# Patient Record
Sex: MALE | Race: WHITE | NOT HISPANIC OR LATINO | Employment: FULL TIME | URBAN - METROPOLITAN AREA
[De-identification: names, ages, dates, MRNs, and addresses within clinical notes are randomized per-mention and may not be internally consistent; named-entity substitution may affect disease eponyms.]

---

## 2018-06-06 ENCOUNTER — APPOINTMENT (EMERGENCY)
Dept: CT IMAGING | Facility: HOSPITAL | Age: 53
DRG: 304 | End: 2018-06-06

## 2018-06-06 ENCOUNTER — HOSPITAL ENCOUNTER (INPATIENT)
Facility: HOSPITAL | Age: 53
LOS: 1 days | Discharge: HOME | DRG: 304 | End: 2018-06-07
Attending: EMERGENCY MEDICINE | Admitting: INTERNAL MEDICINE

## 2018-06-06 DIAGNOSIS — Z72.0 TOBACCO ABUSE: ICD-10-CM

## 2018-06-06 DIAGNOSIS — I16.0 HYPERTENSIVE URGENCY: Primary | ICD-10-CM

## 2018-06-06 DIAGNOSIS — E11.9 TYPE 2 DIABETES MELLITUS WITHOUT COMPLICATION, WITHOUT LONG-TERM CURRENT USE OF INSULIN (HCC): ICD-10-CM

## 2018-06-06 DIAGNOSIS — Z86.73 HISTORY OF CVA (CEREBROVASCULAR ACCIDENT): ICD-10-CM

## 2018-06-06 LAB
ANION GAP SERPL CALCULATED.3IONS-SCNC: 9 MMOL/L (ref 4–13)
ARTERIAL PATENCY WRIST A: ABNORMAL
BASE EXCESS BLDA CALC-SCNC: 0 MMOL/L (ref -2–3)
BASOPHILS # BLD AUTO: 0.11 THOUSANDS/ΜL (ref 0–0.1)
BASOPHILS NFR BLD AUTO: 1 % (ref 0–1)
BILIRUB UR QL STRIP: NEGATIVE
BUN SERPL-MCNC: 16 MG/DL (ref 5–25)
CALCIUM SERPL-MCNC: 9.5 MG/DL (ref 8.3–10.1)
CHLORIDE SERPL-SCNC: 100 MMOL/L (ref 100–108)
CLARITY UR: CLEAR
CO2 SERPL-SCNC: 27 MMOL/L (ref 21–32)
COLOR UR: YELLOW
CREAT SERPL-MCNC: 1.36 MG/DL (ref 0.6–1.3)
EOSINOPHIL # BLD AUTO: 0.2 THOUSAND/ΜL (ref 0–0.61)
EOSINOPHIL NFR BLD AUTO: 1 % (ref 0–6)
ERYTHROCYTE [DISTWIDTH] IN BLOOD BY AUTOMATED COUNT: 12.4 % (ref 11.6–15.1)
FIO2 GAS DIL.REBREATH: 21 L
GFR SERPL CREATININE-BSD FRML MDRD: 60 ML/MIN/1.73SQ M
GLUCOSE SERPL-MCNC: 192 MG/DL (ref 65–140)
GLUCOSE UR STRIP-MCNC: NEGATIVE MG/DL
HCO3 BLDA-SCNC: 24 MMOL/L (ref 22–28)
HCT VFR BLD AUTO: 47.3 % (ref 36.5–49.3)
HGB BLD-MCNC: 16.2 G/DL (ref 12–17)
HGB UR QL STRIP.AUTO: ABNORMAL
IMM GRANULOCYTES # BLD AUTO: 0.12 THOUSAND/UL (ref 0–0.2)
IMM GRANULOCYTES NFR BLD AUTO: 1 % (ref 0–2)
KETONES UR STRIP-MCNC: NEGATIVE MG/DL
LEUKOCYTE ESTERASE UR QL STRIP: NEGATIVE
LYMPHOCYTES # BLD AUTO: 2.34 THOUSANDS/ΜL (ref 0.6–4.47)
LYMPHOCYTES NFR BLD AUTO: 15 % (ref 14–44)
MCH RBC QN AUTO: 30 PG (ref 26.8–34.3)
MCHC RBC AUTO-ENTMCNC: 34.2 G/DL (ref 31.4–37.4)
MCV RBC AUTO: 88 FL (ref 82–98)
MONOCYTES # BLD AUTO: 0.9 THOUSAND/ΜL (ref 0.17–1.22)
MONOCYTES NFR BLD AUTO: 6 % (ref 4–12)
NEUTROPHILS # BLD AUTO: 11.77 THOUSANDS/ΜL (ref 1.85–7.62)
NEUTS SEG NFR BLD AUTO: 76 % (ref 43–75)
NITRITE UR QL STRIP: NEGATIVE
NRBC BLD AUTO-RTO: 0 /100 WBCS
PCO2 BLD: 25 MMOL/L (ref 21–32)
PCO2 BLD: 37.2 MM HG (ref 36–44)
PH BLD: 7.42 [PH] (ref 7.35–7.45)
PH UR STRIP.AUTO: 6 [PH] (ref 4.5–8)
PLATELET # BLD AUTO: 236 THOUSANDS/UL (ref 149–390)
PMV BLD AUTO: 10.7 FL (ref 8.9–12.7)
PO2 BLD: 66 MM HG (ref 75–129)
POTASSIUM SERPL-SCNC: 4.2 MMOL/L (ref 3.5–5.3)
PROT UR STRIP-MCNC: ABNORMAL MG/DL
RBC # BLD AUTO: 5.4 MILLION/UL (ref 3.88–5.62)
SAMPLE SITE: ABNORMAL
SAO2 % BLD FROM PO2: 93 % (ref 95–98)
SODIUM SERPL-SCNC: 136 MMOL/L (ref 136–145)
SP GR UR STRIP.AUTO: 1.02 (ref 1–1.03)
SPECIMEN SOURCE: ABNORMAL
TROPONIN I SERPL-MCNC: <0.02 NG/ML
UROBILINOGEN UR QL STRIP.AUTO: 0.2 E.U./DL
WBC # BLD AUTO: 15.44 THOUSAND/UL (ref 4.31–10.16)

## 2018-06-06 PROCEDURE — 85025 COMPLETE CBC W/AUTO DIFF WBC: CPT | Performed by: EMERGENCY MEDICINE

## 2018-06-06 PROCEDURE — 96375 TX/PRO/DX INJ NEW DRUG ADDON: CPT

## 2018-06-06 PROCEDURE — 36600 WITHDRAWAL OF ARTERIAL BLOOD: CPT

## 2018-06-06 PROCEDURE — 96374 THER/PROPH/DIAG INJ IV PUSH: CPT

## 2018-06-06 PROCEDURE — 80048 BASIC METABOLIC PNL TOTAL CA: CPT | Performed by: EMERGENCY MEDICINE

## 2018-06-06 PROCEDURE — 96361 HYDRATE IV INFUSION ADD-ON: CPT

## 2018-06-06 PROCEDURE — 81001 URINALYSIS AUTO W/SCOPE: CPT | Performed by: EMERGENCY MEDICINE

## 2018-06-06 PROCEDURE — 82803 BLOOD GASES ANY COMBINATION: CPT

## 2018-06-06 PROCEDURE — 70450 CT HEAD/BRAIN W/O DYE: CPT

## 2018-06-06 PROCEDURE — 36415 COLL VENOUS BLD VENIPUNCTURE: CPT | Performed by: EMERGENCY MEDICINE

## 2018-06-06 PROCEDURE — 84484 ASSAY OF TROPONIN QUANT: CPT | Performed by: EMERGENCY MEDICINE

## 2018-06-06 PROCEDURE — 93005 ELECTROCARDIOGRAM TRACING: CPT

## 2018-06-06 RX ORDER — DEXAMETHASONE SODIUM PHOSPHATE 10 MG/ML
8 INJECTION, SOLUTION INTRAMUSCULAR; INTRAVENOUS ONCE
Status: COMPLETED | OUTPATIENT
Start: 2018-06-06 | End: 2018-06-06

## 2018-06-06 RX ORDER — LISINOPRIL 10 MG/1
10 TABLET ORAL DAILY
Status: ON HOLD | COMMUNITY
End: 2018-06-07

## 2018-06-06 RX ORDER — DIPHENHYDRAMINE HYDROCHLORIDE 50 MG/ML
25 INJECTION INTRAMUSCULAR; INTRAVENOUS ONCE
Status: COMPLETED | OUTPATIENT
Start: 2018-06-06 | End: 2018-06-06

## 2018-06-06 RX ORDER — METOCLOPRAMIDE HYDROCHLORIDE 5 MG/ML
5 INJECTION INTRAMUSCULAR; INTRAVENOUS ONCE
Status: COMPLETED | OUTPATIENT
Start: 2018-06-06 | End: 2018-06-06

## 2018-06-06 RX ORDER — LABETALOL HYDROCHLORIDE 5 MG/ML
10 INJECTION, SOLUTION INTRAVENOUS ONCE
Status: COMPLETED | OUTPATIENT
Start: 2018-06-06 | End: 2018-06-06

## 2018-06-06 RX ADMIN — LABETALOL 20 MG/4 ML (5 MG/ML) INTRAVENOUS SYRINGE 10 MG: at 23:10

## 2018-06-06 RX ADMIN — SODIUM CHLORIDE 1000 ML: 0.9 INJECTION, SOLUTION INTRAVENOUS at 22:16

## 2018-06-06 RX ADMIN — DIPHENHYDRAMINE HYDROCHLORIDE 25 MG: 50 INJECTION, SOLUTION INTRAMUSCULAR; INTRAVENOUS at 22:24

## 2018-06-06 RX ADMIN — DEXAMETHASONE SODIUM PHOSPHATE 8 MG: 10 INJECTION, SOLUTION INTRAMUSCULAR; INTRAVENOUS at 22:23

## 2018-06-06 RX ADMIN — METOCLOPRAMIDE 5 MG: 5 INJECTION, SOLUTION INTRAMUSCULAR; INTRAVENOUS at 22:22

## 2018-06-06 RX ADMIN — SODIUM CHLORIDE 1000 ML: 0.9 INJECTION, SOLUTION INTRAVENOUS at 23:34

## 2018-06-07 ENCOUNTER — APPOINTMENT (INPATIENT)
Dept: MRI IMAGING | Facility: HOSPITAL | Age: 53
DRG: 304 | End: 2018-06-07

## 2018-06-07 ENCOUNTER — APPOINTMENT (INPATIENT)
Dept: NON INVASIVE DIAGNOSTICS | Facility: HOSPITAL | Age: 53
DRG: 304 | End: 2018-06-07

## 2018-06-07 ENCOUNTER — APPOINTMENT (INPATIENT)
Dept: ULTRASOUND IMAGING | Facility: HOSPITAL | Age: 53
DRG: 304 | End: 2018-06-07

## 2018-06-07 VITALS
HEART RATE: 100 BPM | DIASTOLIC BLOOD PRESSURE: 83 MMHG | OXYGEN SATURATION: 96 % | TEMPERATURE: 97.7 F | BODY MASS INDEX: 28.97 KG/M2 | SYSTOLIC BLOOD PRESSURE: 140 MMHG | HEIGHT: 74 IN | RESPIRATION RATE: 20 BRPM | WEIGHT: 225.75 LBS

## 2018-06-07 PROBLEM — D72.829 LEUKOCYTOSIS: Status: ACTIVE | Noted: 2018-06-07

## 2018-06-07 PROBLEM — R51.9 HEADACHE: Status: ACTIVE | Noted: 2018-06-07

## 2018-06-07 PROBLEM — Z72.0 TOBACCO ABUSE: Status: ACTIVE | Noted: 2018-06-07

## 2018-06-07 PROBLEM — N28.9 ACUTE KIDNEY INSUFFICIENCY: Status: ACTIVE | Noted: 2018-06-07

## 2018-06-07 PROBLEM — I16.0 HYPERTENSIVE URGENCY: Status: ACTIVE | Noted: 2018-06-07

## 2018-06-07 PROBLEM — Z86.73 HISTORY OF CVA (CEREBROVASCULAR ACCIDENT): Status: ACTIVE | Noted: 2018-06-07

## 2018-06-07 LAB
ANION GAP SERPL CALCULATED.3IONS-SCNC: 13 MMOL/L (ref 4–13)
ATRIAL RATE: 96 BPM
BACTERIA UR QL AUTO: ABNORMAL /HPF
BASOPHILS # BLD AUTO: 0.04 THOUSANDS/ΜL (ref 0–0.1)
BASOPHILS NFR BLD AUTO: 0 % (ref 0–1)
BUN SERPL-MCNC: 14 MG/DL (ref 5–25)
CALCIUM SERPL-MCNC: 9.2 MG/DL (ref 8.3–10.1)
CHLORIDE SERPL-SCNC: 102 MMOL/L (ref 100–108)
CHOLEST SERPL-MCNC: 245 MG/DL (ref 50–200)
CO2 SERPL-SCNC: 22 MMOL/L (ref 21–32)
CREAT SERPL-MCNC: 1.18 MG/DL (ref 0.6–1.3)
EOSINOPHIL # BLD AUTO: 0.01 THOUSAND/ΜL (ref 0–0.61)
EOSINOPHIL NFR BLD AUTO: 0 % (ref 0–6)
ERYTHROCYTE [DISTWIDTH] IN BLOOD BY AUTOMATED COUNT: 12.5 % (ref 11.6–15.1)
EST. AVERAGE GLUCOSE BLD GHB EST-MCNC: 209 MG/DL
GFR SERPL CREATININE-BSD FRML MDRD: 71 ML/MIN/1.73SQ M
GLUCOSE SERPL-MCNC: 233 MG/DL (ref 65–140)
GLUCOSE SERPL-MCNC: 234 MG/DL (ref 65–140)
GLUCOSE SERPL-MCNC: 246 MG/DL (ref 65–140)
HBA1C MFR BLD: 8.9 % (ref 4.2–6.3)
HCT VFR BLD AUTO: 45.9 % (ref 36.5–49.3)
HDLC SERPL-MCNC: 37 MG/DL (ref 40–60)
HGB BLD-MCNC: 15.7 G/DL (ref 12–17)
IMM GRANULOCYTES # BLD AUTO: 0.08 THOUSAND/UL (ref 0–0.2)
IMM GRANULOCYTES NFR BLD AUTO: 1 % (ref 0–2)
LDLC SERPL CALC-MCNC: 153 MG/DL (ref 0–100)
LYMPHOCYTES # BLD AUTO: 1.09 THOUSANDS/ΜL (ref 0.6–4.47)
LYMPHOCYTES NFR BLD AUTO: 10 % (ref 14–44)
MCH RBC QN AUTO: 29.8 PG (ref 26.8–34.3)
MCHC RBC AUTO-ENTMCNC: 34.2 G/DL (ref 31.4–37.4)
MCV RBC AUTO: 87 FL (ref 82–98)
MONOCYTES # BLD AUTO: 0.18 THOUSAND/ΜL (ref 0.17–1.22)
MONOCYTES NFR BLD AUTO: 2 % (ref 4–12)
NEUTROPHILS # BLD AUTO: 9.54 THOUSANDS/ΜL (ref 1.85–7.62)
NEUTS SEG NFR BLD AUTO: 87 % (ref 43–75)
NON-SQ EPI CELLS URNS QL MICRO: ABNORMAL /HPF
NRBC BLD AUTO-RTO: 0 /100 WBCS
P AXIS: 52 DEGREES
PLATELET # BLD AUTO: 218 THOUSANDS/UL (ref 149–390)
PMV BLD AUTO: 11.3 FL (ref 8.9–12.7)
POTASSIUM SERPL-SCNC: 4.2 MMOL/L (ref 3.5–5.3)
PR INTERVAL: 170 MS
QRS AXIS: 0 DEGREES
QRSD INTERVAL: 84 MS
QT INTERVAL: 348 MS
QTC INTERVAL: 439 MS
RBC # BLD AUTO: 5.27 MILLION/UL (ref 3.88–5.62)
RBC #/AREA URNS AUTO: ABNORMAL /HPF
SODIUM SERPL-SCNC: 137 MMOL/L (ref 136–145)
T WAVE AXIS: 53 DEGREES
TRIGL SERPL-MCNC: 275 MG/DL
VENTRICULAR RATE: 96 BPM
WBC # BLD AUTO: 10.94 THOUSAND/UL (ref 4.31–10.16)
WBC #/AREA URNS AUTO: ABNORMAL /HPF

## 2018-06-07 PROCEDURE — 85025 COMPLETE CBC W/AUTO DIFF WBC: CPT | Performed by: NURSE PRACTITIONER

## 2018-06-07 PROCEDURE — 99223 1ST HOSP IP/OBS HIGH 75: CPT | Performed by: NURSE PRACTITIONER

## 2018-06-07 PROCEDURE — 99285 EMERGENCY DEPT VISIT HI MDM: CPT

## 2018-06-07 PROCEDURE — 96361 HYDRATE IV INFUSION ADD-ON: CPT

## 2018-06-07 PROCEDURE — 83036 HEMOGLOBIN GLYCOSYLATED A1C: CPT | Performed by: NURSE PRACTITIONER

## 2018-06-07 PROCEDURE — 70551 MRI BRAIN STEM W/O DYE: CPT

## 2018-06-07 PROCEDURE — 80048 BASIC METABOLIC PNL TOTAL CA: CPT | Performed by: NURSE PRACTITIONER

## 2018-06-07 PROCEDURE — 93010 ELECTROCARDIOGRAM REPORT: CPT | Performed by: INTERNAL MEDICINE

## 2018-06-07 PROCEDURE — 82948 REAGENT STRIP/BLOOD GLUCOSE: CPT

## 2018-06-07 PROCEDURE — 93306 TTE W/DOPPLER COMPLETE: CPT

## 2018-06-07 PROCEDURE — 99255 IP/OBS CONSLTJ NEW/EST HI 80: CPT | Performed by: PSYCHIATRY & NEUROLOGY

## 2018-06-07 PROCEDURE — 80061 LIPID PANEL: CPT | Performed by: NURSE PRACTITIONER

## 2018-06-07 PROCEDURE — 76770 US EXAM ABDO BACK WALL COMP: CPT

## 2018-06-07 PROCEDURE — 99238 HOSP IP/OBS DSCHRG MGMT 30/<: CPT | Performed by: INTERNAL MEDICINE

## 2018-06-07 RX ORDER — NICOTINE 21 MG/24HR
1 PATCH, TRANSDERMAL 24 HOURS TRANSDERMAL ONCE
Qty: 28 PATCH | Refills: 0 | Status: SHIPPED | OUTPATIENT
Start: 2018-06-07 | End: 2018-06-07

## 2018-06-07 RX ORDER — ATORVASTATIN CALCIUM 40 MG/1
40 TABLET, FILM COATED ORAL EVERY EVENING
Qty: 30 TABLET | Refills: 5 | Status: SHIPPED | OUTPATIENT
Start: 2018-06-07

## 2018-06-07 RX ORDER — NICOTINE 21 MG/24HR
14 PATCH, TRANSDERMAL 24 HOURS TRANSDERMAL ONCE
Status: DISCONTINUED | OUTPATIENT
Start: 2018-06-07 | End: 2018-06-07 | Stop reason: HOSPADM

## 2018-06-07 RX ORDER — HYDRALAZINE HYDROCHLORIDE 20 MG/ML
5 INJECTION INTRAMUSCULAR; INTRAVENOUS EVERY 6 HOURS PRN
Status: DISCONTINUED | OUTPATIENT
Start: 2018-06-07 | End: 2018-06-07 | Stop reason: HOSPADM

## 2018-06-07 RX ORDER — ASPIRIN 81 MG/1
81 TABLET, CHEWABLE ORAL DAILY
Refills: 0
Start: 2018-06-08

## 2018-06-07 RX ORDER — HYDRALAZINE HYDROCHLORIDE 20 MG/ML
5 INJECTION INTRAMUSCULAR; INTRAVENOUS ONCE
Status: COMPLETED | OUTPATIENT
Start: 2018-06-07 | End: 2018-06-07

## 2018-06-07 RX ORDER — ASPIRIN 81 MG/1
81 TABLET, CHEWABLE ORAL DAILY
Status: DISCONTINUED | OUTPATIENT
Start: 2018-06-07 | End: 2018-06-07 | Stop reason: HOSPADM

## 2018-06-07 RX ORDER — ACETAMINOPHEN 325 MG/1
650 TABLET ORAL EVERY 6 HOURS PRN
Status: DISCONTINUED | OUTPATIENT
Start: 2018-06-07 | End: 2018-06-07 | Stop reason: HOSPADM

## 2018-06-07 RX ORDER — ATORVASTATIN CALCIUM 40 MG/1
40 TABLET, FILM COATED ORAL EVERY EVENING
Status: DISCONTINUED | OUTPATIENT
Start: 2018-06-07 | End: 2018-06-07 | Stop reason: HOSPADM

## 2018-06-07 RX ORDER — SODIUM CHLORIDE 9 MG/ML
100 INJECTION, SOLUTION INTRAVENOUS CONTINUOUS
Status: DISCONTINUED | OUTPATIENT
Start: 2018-06-07 | End: 2018-06-07 | Stop reason: HOSPADM

## 2018-06-07 RX ORDER — LABETALOL HYDROCHLORIDE 5 MG/ML
20 INJECTION, SOLUTION INTRAVENOUS EVERY 4 HOURS PRN
Status: DISCONTINUED | OUTPATIENT
Start: 2018-06-07 | End: 2018-06-07 | Stop reason: HOSPADM

## 2018-06-07 RX ORDER — LISINOPRIL 10 MG/1
10 TABLET ORAL DAILY
Qty: 30 TABLET | Refills: 5 | Status: SHIPPED | OUTPATIENT
Start: 2018-06-07

## 2018-06-07 RX ADMIN — SODIUM CHLORIDE 100 ML/HR: 0.9 INJECTION, SOLUTION INTRAVENOUS at 06:09

## 2018-06-07 RX ADMIN — ASPIRIN 81 MG 81 MG: 81 TABLET ORAL at 12:42

## 2018-06-07 RX ADMIN — SITAGLIPTIN 100 MG: 50 TABLET, FILM COATED ORAL at 12:42

## 2018-06-07 RX ADMIN — LABETALOL 20 MG/4 ML (5 MG/ML) INTRAVENOUS SYRINGE 20 MG: at 06:35

## 2018-06-07 RX ADMIN — NICOTINE 14 MG: 14 PATCH, EXTENDED RELEASE TRANSDERMAL at 12:43

## 2018-06-07 RX ADMIN — HYDRALAZINE HYDROCHLORIDE 5 MG: 20 INJECTION INTRAMUSCULAR; INTRAVENOUS at 01:54

## 2018-06-07 RX ADMIN — INSULIN LISPRO 2 UNITS: 100 INJECTION, SOLUTION INTRAVENOUS; SUBCUTANEOUS at 12:43

## 2018-06-07 NOTE — H&P
H&P- Evertt Keto 1965, 47 y o  male MRN: [id number]    Unit/Bed#: [address] [place] [address] Encounter: [id number]    Primary Care Provider: Shashi To DO   Date and time admitted to hospital: 6/6/2018  9:57 PM        * Hypertensive urgency   Assessment & Plan    SBP ranging from 170s to 200s  Patient restart lisinopril 2 days ago after stopping it on his own 2 years ago  Will hold lisinopril due to elevated creatinine  Will give hydralazine and labetalol p r n  SBP greater than 180  Restart lisinopril when creatinine improves  Headache   Assessment & Plan    Headache has resolved  Patient was given Decadron, Benadryl, and Reglan in ED  Headache most likely related to elevated BP  Tylenol p r n  headache  Leukocytosis   Assessment & Plan    No obvious signs of infection  Patient was given 1 dose of IV Decadron in the ED  Will administer IV fluids and recheck CBC in a m           Acute kidney insufficiency   Assessment & Plan    Creatinine 1 36 in the ED  No prior history of kidney disease  Will hold lisinopril and metformin while creatinine is elevated  Administer IV fluids for hydration  Recheck BMP in a m     Will obtain renal ultrasound in a m  to look for hydronephrosis  Hypertension   Assessment & Plan    Patient stopped taking lisinopril 2 years ago  Restarted medication 2 days ago  Hold lisinopril for now  Administer hydralazine and labetalol p r n     Continue to monitor BP per nursing unit  Diabetes mellitus [facility])   Assessment & Plan    Patient takes metformin and Januvia at home for his diabetes  Will hold metformin for now due to elevated creatinine  Continue Januvia  Obtain hemoglobin A1c  Monitor blood glucose q a c  and HS and initiate SSI  History of CVA (cerebrovascular accident)   Assessment & Plan    CT scan of head shows multiple chronic lacunar infarcts in the periventricular white matter and right basal ganglia    Patient was unaware of previous CVA  Place patient on Lipitor and aspirin  Obtain MRI and echocardiogram   Inpatient consult to Neurology  VTE Prophylaxis: Pt low risk for VTE  / sequential compression device   Code Status: Full code  POLST: There is no POLST form on file for this patient (pre-hospital)  Discussion with family: Deon present during admission    Anticipated Length of Stay:  Patient will be admitted on an Inpatient basis with an anticipated length of stay of  > 2 midnights  Justification for Hospital Stay: Close neurologic monitoring    Total Time for Visit, including Counseling / Coordination of Care: 30 minutes  Greater than 50% of this total time spent on direct patient counseling and coordination of care  Chief Complaint:   Headache    History of Present Illness:    Artie Quiroz is a 47 y o  male history of diabetes mellitus type 2, hypertension, and current every day smoker who presents with complaints of severe headache  Describes headache as being 12/10 and located frontal area  Patient thought he was having a stroke because he developed a sudden severe headache and had disorientation and slurring of words  In the ED patient has WBC count of 15 44, creatinine 1 36, glucose 192  SBP ranging from 873-794 with diastolic BP in the 365G  CT of head showed multiple chronic lacunar infarcts in the periventricular white matter and right basal ganglia  Patient states that he stopped taking his blood pressure medication 2 years ago because he lost insurance  He was restarted on lisinopril 2 days ago  Patient was unaware of previous strokes  Patient denies fevers or chills  No nausea, vomiting, or diarrhea  Denies chest pain or palpitations  No change in vision  Review of Systems:    Review of Systems   Constitutional: Positive for activity change  Negative for appetite change, chills and fever  Eyes: Negative for visual disturbance     Respiratory: Negative for apnea, cough and shortness of breath  Cardiovascular: Negative for chest pain, palpitations and leg swelling  Gastrointestinal: Negative for abdominal distention, abdominal pain, constipation, diarrhea, nausea and vomiting  Genitourinary: Negative for dysuria  Neurological: Positive for speech difficulty and headaches  Negative for dizziness, seizures, facial asymmetry, weakness, light-headedness and numbness  Psychiatric/Behavioral: Negative for agitation and confusion  The patient is not nervous/anxious  All other systems reviewed and are negative  Past Medical and Surgical History:     Past Medical History:   Diagnosis Date    Diabetes mellitus ([facility] [address] )     Hypertension        Past Surgical History:   Procedure Laterality Date    APPENDECTOMY         Meds/Allergies:    Prior to Admission medications    Medication Sig Start Date End Date Taking? Authorizing Provider   lisinopril (ZESTRIL) 10 mg tablet Take 10 mg by mouth daily   Yes Historical Provider, MD   metFORMIN (GLUCOPHAGE) 1000 MG tablet Take 500 mg by mouth 2 (two) times a day with meals   Yes Historical Provider, MD   sitaGLIPtin (JANUVIA) 100 mg tablet Take 100 mg by mouth daily   Yes Historical Provider, MD     I have reviewed home medications with patient personally      Allergies: No Known Allergies    Social History:     Marital Status: Single   Occupation:    Patient Pre-hospital Living Situation:  With [facility]  Patient Pre-hospital Level of Mobility:  Independent  Patient Pre-hospital Diet Restrictions:  None  Substance Use History:   History   Alcohol Use No     History   Smoking Status    Current Every Day Smoker   Smokeless Tobacco    Never Used     History   Drug Use No       Family History:    non-contributory    Physical Exam:     Vitals:   Blood Pressure: 145/96 (06/07/18 0345)  Pulse: 99 (06/07/18 0115)  Temperature: 97 8 °F (36 6 °C) (06/06/18 2201)  Temp Source: Tympanic (06/06/18 2201)  Respirations: 18 (06/06/18 2250)  SpO2: 97 % (18 0115)    Physical Exam   Constitutional: He is oriented to person, place, and time  He appears well-developed and well-nourished  No distress  HENT:   Head: Normocephalic and atraumatic  Eyes: EOM are normal  Pupils are equal, round, and reactive to light  Neck: Normal range of motion  Neck supple  Cardiovascular: Normal rate, regular rhythm, normal heart sounds and intact distal pulses  Exam reveals no gallop and no friction rub  No murmur heard  Pulmonary/Chest: Effort normal and breath sounds normal  No respiratory distress  He has no wheezes  He has no rales  Abdominal: Soft  Bowel sounds are normal  He exhibits no distension  There is no tenderness  Musculoskeletal: Normal range of motion  He exhibits no edema  Neurological: He is alert and oriented to person, place, and time  He has normal strength  No cranial nerve deficit or sensory deficit  GCS eye subscore is 4  GCS verbal subscore is 5  GCS motor subscore is 6  Skin: Skin is warm and dry  Psychiatric: He has a normal mood and affect  Judgment normal    Nursing note and vitals reviewed  Additional Data:     Lab Results: I have personally reviewed pertinent reports  Results from last 7 days  Lab Units 18  2216   WBC Thousand/uL 15 44*   HEMOGLOBIN g/dL 16 2   HEMATOCRIT % 47 3   PLATELETS Thousands/uL 236   NEUTROS PCT % 76*   LYMPHS PCT % 15   MONOS PCT % 6   EOS PCT % 1       Results from last 7 days  Lab Units 18  2216   SODIUM mmol/L 136   POTASSIUM mmol/L 4 2   CHLORIDE mmol/L 100   CO2 mmol/L 27   BUN mg/dL 16   CREATININE mg/dL 1 36*   CALCIUM mg/dL 9 5   GLUCOSE RANDOM mg/dL 192*                   Imaging: I have personally reviewed pertinent reports  CT head without contrast   Final Result by Ramonia Apgar, MD ( [id number])         1  Multiple chronic lacunar infarcts in the periventricular white matter and right basal ganglia     2   No acute intracranial hemorrhage, mass effect or extra-axial collection  Workstation performed: [id number]         US retroperitoneal complete    (Results Pending)   MRI Inpatient Order    (Results Pending)       EKG, Pathology, and Other Studies Reviewed on Admission:   · EKG: SR    Allscripts / Epic Records Reviewed: Yes     ** Please Note: This note has been constructed using a voice recognition system   **

## 2018-06-07 NOTE — ED NOTES
Wife came out to see how long until pt will go to his room  I called the supervisor to see if the room had been cleaned yet and asked if she knew how long it would be  I informed the wife that the supervisor said it would be approximately 30-60 minutes until the room is ready; I apologized to the pt and wife for this wait       Alejandro Chaves RN  06/07/18 5767

## 2018-06-07 NOTE — ED PROVIDER NOTES
History  Chief Complaint   Patient presents with    Headache     [facility]  Reports he was diagnosed with high blood pressure, started on lisinopril  Per miranda at bedside patient has had headaches off and on x 2 weeks  History provided by:  Patient   used: No    Headache   Associated symptoms: no abdominal pain, no congestion, no cough, no diarrhea, no dizziness, no fever, no nausea, no neck pain, no neck stiffness, no sore throat, no vomiting and no weakness      Pt is a 48 y/o male presenting to ED with htn, headache, feels foggy  Unable to describe the headache, poor historian  No cp or sob  No n/v  No falls or trauma  Not on blood thinner  Has htn and DM, poorly controlled  No back pain or abd pain  No weakness, numbness, tinlging  mdm ct head, cardiac work up, treat headache, re-evaluate            Prior to Admission Medications   Prescriptions Last Dose Informant Patient Reported? Taking?   lisinopril (ZESTRIL) 10 mg tablet  Other (Specify) Yes Yes   Sig: Take 10 mg by mouth daily   metFORMIN (GLUCOPHAGE) 1000 MG tablet  Other (Specify) Yes Yes   Sig: Take 500 mg by mouth 2 (two) times a day with meals   sitaGLIPtin (JANUVIA) 100 mg tablet  Other (Specify) Yes Yes   Sig: Take 100 mg by mouth daily      Facility-Administered Medications: None       Past Medical History:   Diagnosis Date    Diabetes mellitus ([facility] [address] )     Hypertension        Past Surgical History:   Procedure Laterality Date    APPENDECTOMY         History reviewed  No pertinent family history  I have reviewed and agree with the history as documented  Social History   Substance Use Topics    Smoking status: Current Every Day Smoker    Smokeless tobacco: Never Used    Alcohol use No        Review of Systems   Constitutional: Negative for chills, diaphoresis and fever  HENT: Negative for congestion and sore throat      Respiratory: Negative for cough, shortness of breath, wheezing and stridor  Cardiovascular: Negative for chest pain, palpitations and leg swelling  Gastrointestinal: Negative for abdominal pain, blood in stool, diarrhea, nausea and vomiting  Genitourinary: Negative for dysuria, frequency and urgency  Musculoskeletal: Negative for neck pain and neck stiffness  Skin: Negative for pallor and rash  Neurological: Positive for headaches  Negative for dizziness, syncope, weakness and light-headedness  All other systems reviewed and are negative  Physical Exam  Physical Exam   Constitutional: He is oriented to person, place, and time  He appears well-developed and well-nourished  HENT:   Head: Normocephalic and atraumatic  Eyes: EOM are normal  Pupils are equal, round, and reactive to light  Neck: Normal range of motion  Neck supple  Cardiovascular: Normal rate, regular rhythm, normal heart sounds and intact distal pulses  Pulmonary/Chest: Effort normal and breath sounds normal  No respiratory distress  Abdominal: Soft  Bowel sounds are normal  There is no tenderness  Musculoskeletal: Normal range of motion  He exhibits no edema or tenderness  Neurological: He is alert and oriented to person, place, and time  No cranial nerve deficit or sensory deficit  He exhibits normal muscle tone  Coordination normal    Skin: Skin is warm and dry  Capillary refill takes less than 2 seconds  No erythema  No pallor  Vitals reviewed        Vital Signs  ED Triage Vitals   Temperature Pulse Respirations Blood Pressure SpO2   06/06/18 2201 06/06/18 2201 06/06/18 2201 06/06/18 2204 06/06/18 2250   97 8 °F (36 6 °C) 96 18 (!) 175/108 98 %      Temp Source Heart Rate Source Patient Position - Orthostatic VS BP Location FiO2 (%)   06/06/18 2201 06/06/18 2201 -- 06/06/18 2201 --   Tympanic Monitor  Right arm       Pain Score       06/06/18 2202       Worst Possible Pain           Vitals:    06/07/18 0200 06/07/18 0215 06/07/18 0230 06/07/18 0245   BP: (!) 176/96 (!) 177/97 (!) 208/101 (!) 182/91   Pulse:           Visual Acuity  Visual Acuity      Most Recent Value   L Pupil Size (mm)  3   R Pupil Size (mm)  3          ED Medications  Medications   nicotine (NICODERM CQ) 14 mg/24hr TD 24 hr patch 14 mg (not administered)   sodium chloride 0 9 % bolus 1,000 mL (0 mL Intravenous Stopped 6/6/18 2333)   diphenhydrAMINE (BENADRYL) injection 25 mg (25 mg Intravenous Given 6/6/18 2224)   metoclopramide (REGLAN) injection 5 mg (5 mg Intravenous Given 6/6/18 2222)   dexamethasone (PF) (DECADRON) injection 8 mg (8 mg Intravenous Given 6/6/18 2223)   labetalol (NORMODYNE) injection 10 mg (10 mg Intravenous Given 6/6/18 2310)   sodium chloride 0 9 % bolus 1,000 mL (0 mL Intravenous Stopped 6/7/18 0112)   hydrALAZINE (APRESOLINE) injection 5 mg (5 mg Intravenous Given 6/7/18 0154)       Diagnostic Studies  Results Reviewed     Procedure Component Value Units Date/Time    Urine Microscopic [[id number]]  (Abnormal) Collected:  06/06/18 2339    Lab Status:  Final result Specimen:  Urine from Urine, Clean Catch Updated:  06/07/18 0011     RBC, UA 10-20 (A) /hpf      WBC, UA None Seen /hpf      Epithelial Cells Occasional /hpf      Bacteria, UA Occasional /hpf     UA w Reflex to Microscopic w Reflex to Culture [[id number]]  (Abnormal) Collected:  06/06/18 2339    Lab Status:  Final result Specimen:  Urine from Urine, Clean Catch Updated:  06/06/18 2349     Color, UA Yellow     Clarity, UA Clear     Specific Gravity, UA 1 025     pH, UA 6 0     Leukocytes, UA Negative     Nitrite, UA Negative     Protein,  (2+) (A) mg/dl      Glucose, UA Negative mg/dl      Ketones, UA Negative mg/dl      Urobilinogen, UA 0 2 E U /dl      Bilirubin, UA Negative     Blood, UA Moderate (A)    Troponin I [[id number]]  (Normal) Collected:  06/06/18 1015    Lab Status:  Final result Specimen:  Blood from Arm, Right Updated:  06/06/18 2246     Troponin I <0 02 ng/mL     Narrative:         [employer] Chemistry analyzer 99% cutoff is > 0 04 ng/mL in network labs    o cTnI 99% cutoff is useful only when applied to patients in the clinical setting of myocardial ischemia  o cTnI 99% cutoff should be interpreted in the context of clinical history, ECG findings and possibly cardiac imaging to establish correct diagnosis  o cTnI 99% cutoff may be suggestive but clearly not indicative of a coronary event without the clinical setting of myocardial ischemia  Basic metabolic panel [[id number]]  (Abnormal) Collected:  06/06/18 2216    Lab Status:  Final result Specimen:  Blood from Arm, Right Updated:  06/06/18 2239     Sodium 136 mmol/L      Potassium 4 2 mmol/L      Chloride 100 mmol/L      CO2 27 mmol/L      Anion Gap 9 mmol/L      BUN 16 mg/dL      Creatinine 1 36 (H) mg/dL      Glucose 192 (H) mg/dL      Calcium 9 5 mg/dL      eGFR 60 ml/min/1 73sq m     Narrative:         National Kidney Disease Education Program recommendations are as follows:  GFR calculation is accurate only with a steady state creatinine  Chronic Kidney disease less than 60 ml/min/1 73 sq  meters  Kidney failure less than 15 ml/min/1 73 sq  meters      POCT Blood Gas (G3+) [[id number]]  (Abnormal) Collected:  06/06/18 2234    Lab Status:  Final result Updated:  06/06/18 2237     pH, Art i-STAT 7 419     pCO2, Art i-STAT 37 2 mm HG      pO2, ART i-STAT 66 0 (L) mm HG      BE, i-STAT 0 mmol/L      HCO3, Art i-STAT 24 0 mmol/L      CO2, i-STAT 25 mmol/L      O2 Sat, i-STAT 93 (L) %      POC FIO2 21 L      Specimen Type ARTERIAL     SITE Right Radial     BELLA TEST Postive Bella Test    CBC and differential [[id number]]  (Abnormal) Collected:  06/06/18 2216    Lab Status:  Final result Specimen:  Blood from Arm, Right Updated:  06/06/18 2225     WBC 15 44 (H) Thousand/uL      RBC 5 40 Million/uL      Hemoglobin 16 2 g/dL      Hematocrit 47 3 %      MCV 88 fL      MCH 30 0 pg      MCHC 34 2 g/dL      RDW 12 4 %      MPV 10 7 fL      Platelets 525 Thousands/uL      nRBC 0 /100 WBCs      Neutrophils Relative 76 (H) %      Immat GRANS % 1 %      Lymphocytes Relative 15 %      Monocytes Relative 6 %      Eosinophils Relative 1 %      Basophils Relative 1 %      Neutrophils Absolute 11 77 (H) Thousands/µL      Immature Grans Absolute 0 12 Thousand/uL      Lymphocytes Absolute 2 34 Thousands/µL      Monocytes Absolute 0 90 Thousand/µL      Eosinophils Absolute 0 20 Thousand/µL      Basophils Absolute 0 11 (H) Thousands/µL                  CT head without contrast   Final Result by Meron Hamilton MD (06/06 4711)         1  Multiple chronic lacunar infarcts in the periventricular white matter and right basal ganglia  2   No acute intracranial hemorrhage, mass effect or extra-axial collection  Workstation performed: [id number]                    Procedures  Procedures       Phone Contacts  ED Phone Contact    ED Course  ED Course as of Jun 07 0328   Thu Jun 07, 2018   0011 Pt feels better                                MDM  CritCare Time    Disposition  Final diagnoses:   Hypertensive urgency     Time reflects when diagnosis was documented in both MDM as applicable and the Disposition within this note     Time User Action Codes Description Comment    6/7/2018  1:49 AM Sugey Strong Add [I16 0] Hypertensive urgency       ED Disposition     ED Disposition Condition Comment    Admit  Case was discussed with medicine and the patient's admission status was agreed to be Admission Status: inpatient status to the service of Dr Jeni Vidal   Follow-up Information    None         Patient's Medications   Discharge Prescriptions    No medications on file     No discharge procedures on file      ED Provider  Electronically Signed by           Enoc Layton MD  06/07/18 5755

## 2018-06-07 NOTE — ASSESSMENT & PLAN NOTE
Patient takes metformin and Januvia at home for his diabetes  Will hold metformin for now due to elevated creatinine  Continue Januvia  Obtain hemoglobin A1c  Monitor blood glucose q a c  and HS and initiate SSI

## 2018-06-07 NOTE — ED NOTES
Went in with nicotine patch as ordered  Pt states he does not want it right now; states "I'm going to have a quick cigarette on the way upstairs "  Explained to pt that this is a no smoking campus  Wife present and encouraged pt to take the patch, however, pt does not want it at this time  Asked pt to let me know if he changes his mind  Dr Stone Márquez aware       [place] Crimes, RN  06/07/18 4327

## 2018-06-07 NOTE — DISCHARGE SUMMARY
Discharge Summary - Iván Johns 47 y o  male MRN: [id number]    Unit/Bed#: [address] [place] [address] Encounter: [id number]    Admission Date: 6/6/2018     Admitting Diagnosis: Hypertensive urgency [I16 0]  Headache [R51]    HPI:  64-year-old male admitted with severe headache and accelerated hypertension with some slurred speech  He had been noncompliant with medications due to insurance issues   Consults  Neurology-Dr Sheba Valero  Procedures Performed: No orders of the defined types were placed in this encounter  Hospital Course:  Patient was admitted to med surge floor with telemetry and given IV labetalol followed by resumption of his oral meds  He also had serial glucometer checks and serial neuro checks performed  He was seen by Neurology who felt that he had evidence of toxic metabolic encephalopathy  MRI does show evidence of multiple small vessel changes and he will need to have follow-up with outpatient vascular Neurology in 1-2 months  His echocardiogram was pending at the time of this dictation  Close control blood pressure cholesterol and eliminating other risk factors such as tobacco use to try to prevent further vascular events  He will be discharged on aspirin and statins  He will be instructed to follow through with his primary physician in 1-2 weeks and Neurology in 1-2 months  Significant Findings, Care, Treatment and Services Provided:     Mri-IMPRESSION:     Multiple white matter lesions are identified within the cerebral hemispheres, brainstem and left cerebellum  The findings suggest a combination of chronic microangiopathic change and multiple scattered infarcts in different vascular territories  There   is no significant mass effect or hemorrhagic transformation      Several lesions appear to be oriented somewhat perpendicular to the surface of the lateral ventricles and demonstrate only faint diffusion hyperintensity and are not definitively restricted on the ADC map    These white matter lesions may represent late   subacute or chronic ischemia  However, the possibility of superimposed chronic demyelinating disease should be considered  Recommend short-term follow-up MRI in 2-3 months with contrast and consider neurology workup for demyelinating disease depending   on patient's clinical findings  Imaging of the cervical and thoracic cord may help define additional sites of demyelinating disease        General appearance: alert and Anxious  Neck: no carotid bruit, no JVD and supple, symmetrical, trachea midline  Lungs: clear to auscultation bilaterally  Heart: regular rate and rhythm, S1, S2 normal, no murmur, click, rub or gallop  Abdomen: soft, non-tender; bowel sounds normal; no masses,  no organomegaly  Extremities: extremities normal, warm and well-perfused; no cyanosis, clubbing, or edema  Skin: Skin color, texture, turgor normal  No rashes or lesions  Neurologic:  Distracted, some concentration difficulties  No tremors   strength is symmetrical      Complications: none  Discharge Diagnosis: Principal Problem:    Hypertensive urgency  Active Problems:    Hypertension    Diabetes mellitus ([address] )    Headache    History of CVA (cerebrovascular accident)    Leukocytosis    Acute kidney insufficiency    Tobacco abuse        Condition at Discharge: good     Discharge instructions/Information to patient and family:   See after visit summary for information provided to patient and family  Provisions for Follow-Up Care:  See after visit summary for information related to follow-up care and any pertinent home health orders  Disposition: Home    Planned Readmission: No    Discharge Statement   I spent 25 minutes discharging the patient  This time was spent on the day of discharge  I had direct contact with the patient on the day of discharge  Discussed with patient and wife at bedside plans to have follow-up with Neurology    As he lives [address] of here will be giving him phone number for Dr Reymundo Dukes felt been  Discharge Medications:  See after visit summary for reconciled discharge medications provided to patient and family          [email or web address], DO

## 2018-06-07 NOTE — ASSESSMENT & PLAN NOTE
Headache has resolved  Patient was given Decadron, Benadryl, and Reglan in ED  Headache most likely related to elevated BP  Tylenol p r n  headache

## 2018-06-07 NOTE — ASSESSMENT & PLAN NOTE
Creatinine 1 36 in the ED  No prior history of kidney disease  Will hold lisinopril and metformin while creatinine is elevated  Administer IV fluids for hydration  Recheck BMP in a m     Will obtain renal ultrasound in a m  to look for hydronephrosis

## 2018-06-07 NOTE — ED NOTES
Ambulated pt in hallway without any difficulty; states his headache is gone       Tree Quesada RN  06/07/18 7742

## 2018-06-07 NOTE — CONSULTS
Consultation - Neurology   Maikel Ha 47 y o  male MRN: [id number]  Unit/Bed#: [address] [facility] 210-02 Encounter: [id number]      Physician Requesting Consult: Niurka Whitaker MD  Inpatient consult to Neurology  Consult performed by: Mohit FERGUSON  Consult ordered by: Gill Suárez        Reason for Consult / Principal Problem: ams, HA    Assessment:  1  Hypertensive HA resolved  Despite normalization of blood pressure, he is still exhibiting cognitive deficits however appears more likely fatigue related at this point  It does not appear that he has deficits at baseline  WBC improved from 15 to 11 however no clinical evidence of infection and may have been stress related  He also presented with mild JUJU which has resolved  2  Toxic/metabolic encephalopathy secondary to htn, juju  Improved but still present  Despite normalization of blood pressure, he is still exhibiting cognitive deficits however appears more likely fatigue related at this point  It does not appear that he has deficits at baseline  WBC improved from 15 to 11 however no clinical evidence of infection and may have been stress related  He also presented with mild JUJU which has resolved  Cannot exclude the development of an acute cva in context of the bp that had been high  Additionally he doesn't appear to be in nicotine withdrawal and is taking the nicoderm patch  No evidence of trauma  Also no clinical evidence of seizure activity  CT head reviewed and no obvious atrophy that would suggest an underlying cognitive impairment/dementia process of which the stress on the brain from the htn/juju although resolved may cause a delay in cognitive recovery  Additionally he has normal gait and on exam distal LE showed decreased proprioception and vibration, higher suspicion this is more reflective of inattention than actual neuropathy      Plan:  Mri brain w/o contrast  Lipid panel, hba1c pending  Will order tsh, b12, vit d  Continue aspirin, statin started here  If mri brain scan negative for acute stroke, stable for discharge as bp now near normal range  Consider checking bp morning/afternoon/evening for one week and recording these numbers and then notifying pcp of the results if any readings greater than 140  Outpt follow up with [facility] neurology, preferably vascular neurologist in 1-2 months  HPI: Mele Villagran is a 47 y o  male who presents with a severe HA in the frontal region  He also began to slur and became disoriented and was found to have sbp up to 210  He hasn't taken bp meds for the last couple years as he had lost insurance however a couple days ago was restarted on lisinopril  No additional symptoms  CT head showing chronic strokes of which he was not aware of  ROS:  Per 12 point review-        Historical Information   Past Medical History:   Diagnosis Date    Diabetes mellitus ([facility] [address] )     Hypertension      Past Surgical History:   Procedure Laterality Date    APPENDECTOMY       Social History   History   Smoking Status    Current Every Day Smoker   Smokeless Tobacco    Never Used     History   Alcohol Use No     History   Drug Use No       Family History: non-contributory      Meds/Allergies     No Known Allergies    all current active meds have been reviewed    Objective   Vitals:Blood pressure 143/89, pulse (!) 110, temperature 98 °F (36 7 °C), temperature source Oral, resp  rate 18, height 6' 2" (1 88 m), weight 102 kg (225 lb 12 oz), SpO2 96 %  ,Body mass index is 28 98 kg/m²  Physical Exam:   Physical Exam General appearance: a bit sluggish, appears stated age and cooperative  Head: Normocephalic, without obvious abnormality, atraumatic  Lungs: clear to auscultation bilaterally  Heart: regular rate and rhythm    Neurologic:  Cognitive:  Mental status: Alert, orientedX3, thought content appropriate  Attention/concentration impaired  Insight intact  No expressive/receptive aphasia  3/3 immediate recall   1/3 delayed recall with and without character cue  Able to state 7 quarters in $1 75 after first stating 5 then correcting himself  Similarly he exhibited some rt/left confusion with finger to nose   CN: CNII-XII normal  Fundoscopy wnl  Motor: normal tone and bulk  5 power UE/LE bilat  Sensory: vibration sensation and proprioception diminished distally bilaterally, light touch intact  Cerebellar: finger to nose, heel to shin no dysmetria or ataxia  Slight tremor upon touching nose and upon end of extension of both arms  DTR's: 2 ue/le bilat  Negative hoffmans, cross adductors, ankle clonus  Plantars: downgoing bilat  Gait: normal gait and arm swing  Able work on heels/toes  Lab Results: I have personally reviewed pertinent reports        Imaging Studies: I have personally reviewed pertinent films in PACS    EKG, Pathology, and Other Studies: I have personally reviewed pertinent films in PACS

## 2018-06-07 NOTE — ED NOTES
Patient returned from ct  Call bell within reach, bed low position  One set of lights turned off in room       Alisson Pinzon RN  06/06/18 3512

## 2018-06-07 NOTE — ASSESSMENT & PLAN NOTE
SBP ranging from 170s to 200s  Patient restart lisinopril 2 days ago after stopping it on his own 2 years ago  Will hold lisinopril due to elevated creatinine  Will give hydralazine and labetalol p r n  SBP greater than 180  Restart lisinopril when creatinine improves

## 2018-06-07 NOTE — ASSESSMENT & PLAN NOTE
No obvious signs of infection  Patient was given 1 dose of IV Decadron in the ED  Will administer IV fluids and recheck CBC in [place]

## 2018-06-07 NOTE — ASSESSMENT & PLAN NOTE
CT scan of head shows multiple chronic lacunar infarcts in the periventricular white matter and right basal ganglia  Patient was unaware of previous CVA  Place patient on Lipitor and aspirin  Obtain MRI and echocardiogram   Inpatient consult to Neurology

## 2018-06-07 NOTE — ASSESSMENT & PLAN NOTE
Patient stopped taking lisinopril 2 years ago  Restarted medication 2 days ago  Hold lisinopril for now  Administer hydralazine and labetalol p r n     Continue to monitor BP per nursing unit

## 2018-06-07 NOTE — SOCIAL WORK
Met with patient, discussed role of Care Management  Patient resides with his SO in a 2 story home  He is independent of ADL's, is employed and drives  He uses no assistive device  He thinks he will have medical insurance after 7/1/18 thru his SO  Referral to Pricila Cazares, Financial Counselor so PATHS can assist with MA application  He plans to return home and anticipates no discharge needs

## 2018-06-07 NOTE — ED NOTES
Went in to see pt; monitor is beeping but pt prefers not to be connected to EKG monitor; BP cuff repositioned on pt and oxygen sat on finger and he allowed both; helped pt reposition and straightened covers for pt; pt and wife very appreciative of straightening and untangling monitor cords  BP now 176/98       Francisca Acuna RN  06/06/18 3138

## 2018-06-08 ENCOUNTER — TELEPHONE (OUTPATIENT)
Dept: NEUROLOGY | Facility: CLINIC | Age: 53
End: 2018-06-08

## 2018-06-08 PROCEDURE — 93306 TTE W/DOPPLER COMPLETE: CPT | Performed by: INTERNAL MEDICINE

## 2018-06-08 NOTE — CASE MANAGEMENT
Initial Clinical Review  Admission: Date/Time/Statement: 6/7/18 @ 0150   Orders Placed This Encounter   Procedures    Inpatient Admission (expected length of stay for this patient is greater than two midnights)     Standing Status:   Standing     Number of Occurrences:   1     Order Specific Question:   Admitting Physician     Answer:   Ev Villarreal [[id number]]     Order Specific Question:   Level of Care     Answer:   Med Surg [[id number]]     Order Specific Question:   Estimated length of stay     Answer:   More than 2 Midnights     Order Specific Question:   Certification     Answer:   I certify that inpatient services are medically necessary for this patient for a duration of greater than two midnights  See H&P and MD Progress Notes for additional information about the patient's course of treatment  ED: Date/Time/Mode of Arrival:   ED Arrival Information     Expected Arrival Acuity Means of Arrival Escorted By Service Admission Type    - 6/6/2018 21:10 Emergent [address] Emergency    Arrival Complaint    HEADACHE      Chief Complaint:   Chief Complaint   Patient presents with    Headache     Saw [facility] yesterday for being out [address]  Reports he was diagnosed with high blood pressure, started on lisinopril  Per miranda at bedside patient has had headaches off and on x 2 weeks  History of Illness:   Pt is a 48 y/o male presenting to ED with htn, headache, feels foggy  Unable to describe the headache, poor historian  No cp or sob  No n/v  No falls or trauma  Not on blood thinner  Has htn and DM, poorly controlled  No back pain or abd pain  No weakness, numbness, tinlging    ED Vital Signs:   ED Triage Vitals   Temperature Pulse Respirations Blood Pressure SpO2   06/06/18 2201 06/06/18 2201 06/06/18 2201 06/06/18 2204 06/06/18 2250   97 8 °F (36 6 °C) 96 18 (!) 175/108 98 %      Temp Source Heart Rate Source Patient Position - Orthostatic VS BP Location FiO2 (%)   06/06/18 2201 06/06/18 2201 06/07/18 0451 06/06/18 2201 --   Tympanic Monitor Lying Right arm       Pain Score       06/06/18 2202       Worst Possible Pain        Wt Readings from Last 1 Encounters:   06/07/18 102 kg (225 lb 12 oz)   Vital Signs (abnormal):   /124, 182/91, 205/107  Abnormal Labs/Diagnostic Test Results:   TROP NEG  WBC 15 44 CR 1 36 GLUC 192   pH, Art i-STAT 7 350 - 7 450 7 419     pCO2, Art i-STAT 36 0 - 44 0 mm HG 37 2     pO2, ART i-STAT 75 0 - 129 0 mm HG 66 0   L    BE, i-STAT -2 - 3 mmol/L 0     HCO3, Art i-STAT 22 0 - 28 0 mmol/L 24 0     CO2, i-STAT 21 - 32 mmol/L 25     O2 Sat, i-STAT 95 - 98 % 93   L    POC FIO2 L 21     Specimen Type  ARTERIAL       Color, UA  Yellow     Clarity, UA  Clear     Specific Cut Off, UA 1 003 - 1 030 1 025     pH, UA 4 5 - 8 0 6 0     Leukocytes, UA Negative Negative     Nitrite, UA Negative Negative     Protein, UA Negative mg/dl 100 (2+)   A    Glucose, UA Negative mg/dl Negative     Ketones, UA Negative mg/dl Negative     Urobilinogen, UA 0 2, 1 0 E U /dl E U /dl 0 2     Bilirubin, UA Negative Negative     Blood, UA Negative Moderate   A      CT HEAD=1  Multiple chronic lacunar infarcts in the periventricular white matter and right basal ganglia  2   No acute intracranial hemorrhage, mass effect or extra-axial collection  US KIDNEYS & BLADDER=No hydronephrosis  Slight bladder wall thickening  MRI BRAIN=Multiple white matter lesions are identified within the cerebral hemispheres, brainstem and left cerebellum  The findings suggest a combination of chronic microangiopathic change and multiple scattered infarcts in different vascular territories  There   is no significant mass effect or hemorrhagic transformation  Several lesions appear to be oriented somewhat perpendicular to the surface of the lateral ventricles and demonstrate only faint diffusion hyperintensity and are not definitively restricted on the ADC map    These white matter lesions may represent late   subacute or chronic ischemia  However, the possibility of superimposed chronic demyelinating disease should be considered  Recommend short-term follow-up MRI in 2-3 months with contrast and consider neurology workup for demyelinating disease depending   on patient's clinical findings  Imaging of the cervical and thoracic cord may help define additional sites of demyelinating disease  EKG=  Ventricular Rate BPM 96    Atrial Rate BPM 96    SD Interval ms 170    QRSD Interval ms 84    QT Interval ms 348    QTC Interval ms 439    P Axis degrees 52    QRS Axis degrees 0    T Wave Axis degrees 53      Normal sinus rhythm      ED Treatment:   Medication Administration from 06/06/2018 2109 to 06/07/2018 0408       Date/Time Order Dose Route Action Action by Comments     06/06/2018 2333 sodium chloride 0 9 % bolus 1,000 mL 0 mL Intravenous Stopped Alejandro Chaves RN      06/06/2018 2216 sodium chloride 0 9 % bolus 1,000 mL 1,000 mL Intravenous [address] Abiodun Barillas [facility]      06/06/2018 2224 diphenhydrAMINE (BENADRYL) injection 25 mg 25 mg Intravenous Given Abiodun Barillas RN      06/06/2018 2222 metoclopramide (REGLAN) injection 5 mg 5 mg Intravenous Given Abiodun Barillas RN      06/06/2018 2223 dexamethasone (PF) (DECADRON) injection 8 mg 8 mg Intravenous Given Abiodun Barillas RN      06/06/2018 2310 labetalol (NORMODYNE) injection 10 mg 10 mg Intravenous Given Abiodun Barillas RN bp 196 124     06/07/2018 0112 sodium chloride 0 9 % bolus 1,000 mL 0 mL Intravenous Stopped Alejandro Chaves RN      06/06/2018 2334 sodium chloride 0 9 % bolus 1,000 mL 1,000 mL Intravenous [address] Alejandro Chaves RN      06/07/2018 0154 hydrALAZINE (APRESOLINE) injection 5 mg 5 mg Intravenous Given Alejandro Chaves RN       Past Medical/Surgical History:    Active Ambulatory Problems     Diagnosis Date Noted    No Active Ambulatory Problems     Resolved Ambulatory Problems     Diagnosis Date Noted    No Resolved Ambulatory Problems     Past Medical History:   Diagnosis Date    Diabetes mellitus ([facility] [address] )     Hypertension    Admitting Diagnosis: Hypertensive urgency [I16 0]  Headache [R51]  Age/Sex: 47 y o  male  Assessment/Plan:   Hypertensive urgency   Assessment & Plan     SBP ranging from 170s to 200s  Patient restart lisinopril 2 days ago after stopping it on his own 2 years ago  Will hold lisinopril due to elevated creatinine  Will give hydralazine and labetalol p r n  SBP greater than 180  Restart lisinopril when creatinine improves        Headache   Assessment & Plan     Headache has resolved  Patient was given Decadron, Benadryl, and Reglan in ED  Headache most likely related to elevated BP  Tylenol p r n  headache        Leukocytosis   Assessment & Plan     No obvious signs of infection  Patient was given 1 dose of IV Decadron in the ED  Will administer IV fluids and recheck CBC in a m          Acute kidney insufficiency   Assessment & Plan     Creatinine 1 36 in the ED  No prior history of kidney disease  Will hold lisinopril and metformin while creatinine is elevated  Administer IV fluids for hydration  Recheck BMP in a m     Will obtain renal ultrasound in a m  to look for hydronephrosis        Hypertension   Assessment & Plan     Patient stopped taking lisinopril 2 years ago  Restarted medication 2 days ago  Hold lisinopril for now  Administer hydralazine and labetalol p r n     Continue to monitor BP per nursing unit        Diabetes mellitus [facility])   Assessment & Plan     Patient takes metformin and Januvia at home for his diabetes  Will hold metformin for now due to elevated creatinine  Continue Januvia  Obtain hemoglobin A1c  Monitor blood glucose q a c  and HS and initiate SSI        History of CVA (cerebrovascular accident)   Assessment & Plan     CT scan of head shows multiple chronic lacunar infarcts in the periventricular white matter and right basal ganglia  Patient was unaware of previous CVA    Place patient on Lipitor and aspirin  Obtain MRI and echocardiogram   Inpatient consult to Neurology         PER NEURO  Assessment:  1  Hypertensive HA resolved  Despite normalization of blood pressure, he is still exhibiting cognitive deficits however appears more likely fatigue related at this point  It does not appear that he has deficits at baseline  WBC improved from 15 to 11 however no clinical evidence of infection and may have been stress related  He also presented with mild JUJU which has resolved  2  Toxic/metabolic encephalopathy secondary to htn, juju  Improved but still present  Despite normalization of blood pressure, he is still exhibiting cognitive deficits however appears more likely fatigue related at this point  It does not appear that he has deficits at baseline  WBC improved from 15 to 11 however no clinical evidence of infection and may have been stress related  He also presented with mild JUJU which has resolved  Cannot exclude the development of an acute cva in context of the bp that had been high  Additionally he doesn't appear to be in nicotine withdrawal and is taking the nicoderm patch  No evidence of trauma  Also no clinical evidence of seizure activity  CT head reviewed and no obvious atrophy that would suggest an underlying cognitive impairment/dementia process of which the stress on the brain from the htn/juju although resolved may cause a delay in cognitive recovery  Additionally he has normal gait and on exam distal LE showed decreased proprioception and vibration, higher suspicion this is more reflective of inattention than actual neuropathy  Plan:  Mri brain w/o contrast  Lipid panel, hba1c pending  Will order tsh, b12, vit d  Continue aspirin, statin started here  If mri brain scan negative for acute stroke, stable for discharge as bp now near normal range    Consider checking bp morning/afternoon/evening for one week and recording these numbers and then notifying pcp of the results if any readings greater than 140  Outpt follow up with [facility] neurology, preferably vascular neurologist in 1-2 months  Admission Orders:  Scheduled Meds:   Continuous Infusions:   No current facility-administered medications for this encounter     PRN Meds:

## 2018-06-08 NOTE — PLAN OF CARE

## 2018-06-10 NOTE — PHYSICIAN ADVISOR
Current patient class: Inpatient  The patient is currently on Hospital Day: 2 at [place] [address]      The patient was admitted to the hospital  on 6/7/18 at [id number] for the following diagnosis:  Hypertensive urgency [I16 0]  Headache [R51]     After review of the relevant documentation, labs, vital signs and test results, the patient is a provider liable case and is most appropriate for OBSERVATION STATUS  In this particular case the patient was admitted to the hospital as an inpatient  The patient however fails to satisfy the 2 midnight benchmark and closer scrutiny of the case is warranted  After review of the patient presentation and relevant labs the patient was most appropriate for observation status on admission  Given that this patient has already been discharged prior to this review they become a provider liable case  Rationale is as follows: The patient is a 47 yrs   Male who presented to the ED at 6/6/2018  9:57 PM with a chief complaint of Headache (Saw [facility] yesterday for being out Januvia  Reports he was diagnosed with high blood pressure, started on lisinopril  Per miranda at bedside patient has had headaches off and on x 2 weeks   )     Pt admitted as an inpatient principally for monitoring of neuro status  There were no acute findings on imaging  Pt did require monitoring of BP, given hypertensive urgency on arrival  The patient did not require a 2MN stay      The patients vitals on arrival were ED Triage Vitals   Temperature Pulse Respirations Blood Pressure SpO2   06/06/18 2201 06/06/18 2201 06/06/18 2201 06/06/18 2204 06/06/18 2250   97 8 °F (36 6 °C) 96 18 (!) 175/108 98 %      Temp Source Heart Rate Source Patient Position - Orthostatic VS BP Location FiO2 (%)   06/06/18 2201 06/06/18 2201 06/07/18 0451 06/06/18 2201 --   Tympanic Monitor Lying Right arm       Pain Score       06/06/18 2202       Worst Possible Pain           Past Medical History:   Diagnosis Date    Diabetes mellitus ([facility] [address] )     Hypertension      Past Surgical History:   Procedure Laterality Date    APPENDECTOMY             Consults have been placed to:   IP CONSULT TO NEUROLOGY    Vitals:    06/07/18 0751 06/07/18 0950 06/07/18 1150 06/07/18 1350   BP: 143/89 144/86 147/78 140/83   BP Location: Left arm Left arm Left arm Right arm   Pulse: (!) 110 (!) 110 (!) 111 100   Resp: 18 18 20 20   Temp: 98 °F (36 7 °C) 98 1 °F (36 7 °C) 98 2 °F (36 8 °C) 97 7 °F (36 5 °C)   TempSrc: Oral Oral Oral Oral   SpO2: 96% 94% 97% 96%   Weight: 102 kg (225 lb 12 oz)      Height:           Most recent labs:    No results for input(s): WBC, HGB, HCT, PLT, K, NA, CALCIUM, BUN, CREATININE, LIPASE, AMYLASE, INR, TROPONINI, CKTOTAL, AST, ALT, ALKPHOS, BILITOT in the last 72 hours  Scheduled Meds:  Continuous Infusions:  No current facility-administered medications for this encounter  PRN Meds:      Surgical procedures (if appropriate):